# Patient Record
Sex: FEMALE | Race: WHITE | NOT HISPANIC OR LATINO | URBAN - METROPOLITAN AREA
[De-identification: names, ages, dates, MRNs, and addresses within clinical notes are randomized per-mention and may not be internally consistent; named-entity substitution may affect disease eponyms.]

---

## 2018-10-04 ENCOUNTER — HISTORICAL (OUTPATIENT)
Dept: ADMINISTRATIVE | Facility: HOSPITAL | Age: 58
End: 2018-10-04

## 2018-10-04 LAB
APPEARANCE, UA: CLEAR
BACTERIA #/AREA URNS AUTO: ABNORMAL /[HPF]
BILIRUB UR QL STRIP: NEGATIVE
COLOR UR: ABNORMAL
GLUCOSE (UA): NORMAL
HGB UR QL STRIP: NEGATIVE
HYALINE CASTS #/AREA URNS LPF: ABNORMAL /[LPF]
KETONES UR QL STRIP: NEGATIVE
LEUKOCYTE ESTERASE UR QL STRIP: NEGATIVE
NITRITE UR QL STRIP: NEGATIVE
PH UR STRIP: 6.5 [PH] (ref 4.5–8)
PROT UR QL STRIP: NEGATIVE
RBC #/AREA URNS AUTO: ABNORMAL /[HPF]
SP GR UR STRIP: 1 (ref 1–1.03)
SQUAMOUS #/AREA URNS LPF: ABNORMAL /[LPF]
UROBILINOGEN UR STRIP-ACNC: NORMAL
WBC #/AREA URNS AUTO: ABNORMAL /HPF

## 2018-10-06 LAB — FINAL CULTURE: NO GROWTH

## 2018-11-29 ENCOUNTER — HISTORICAL (OUTPATIENT)
Dept: ADMINISTRATIVE | Facility: HOSPITAL | Age: 58
End: 2018-11-29

## 2018-11-29 LAB
ABS NEUT (OLG): 4.21 X10(3)/MCL (ref 2.1–9.2)
ALBUMIN SERPL-MCNC: 4.5 GM/DL (ref 3.4–5)
ALBUMIN/GLOB SERPL: 1 RATIO (ref 1–2)
ALP SERPL-CCNC: 71 UNIT/L (ref 45–117)
ALT SERPL-CCNC: 26 UNIT/L (ref 12–78)
APTT PPP: 28.1 SECOND(S) (ref 23.3–37)
AST SERPL-CCNC: 32 UNIT/L (ref 15–37)
BASOPHILS # BLD AUTO: 0.07 X10(3)/MCL
BASOPHILS NFR BLD AUTO: 1 %
BILIRUB SERPL-MCNC: 0.4 MG/DL (ref 0.2–1)
BILIRUBIN DIRECT+TOT PNL SERPL-MCNC: 0.1 MG/DL
BILIRUBIN DIRECT+TOT PNL SERPL-MCNC: 0.3 MG/DL
BUN SERPL-MCNC: 8 MG/DL (ref 7–18)
CALCIUM SERPL-MCNC: 9.4 MG/DL (ref 8.5–10.1)
CHLORIDE SERPL-SCNC: 103 MMOL/L (ref 98–107)
CO2 SERPL-SCNC: 30 MMOL/L (ref 21–32)
CREAT SERPL-MCNC: 0.8 MG/DL (ref 0.6–1.3)
EOSINOPHIL # BLD AUTO: 0.16 X10(3)/MCL
EOSINOPHIL NFR BLD AUTO: 2 %
ERYTHROCYTE [DISTWIDTH] IN BLOOD BY AUTOMATED COUNT: 11.9 % (ref 11.5–14.5)
GLOBULIN SER-MCNC: 3.7 GM/ML (ref 2.3–3.5)
GLUCOSE SERPL-MCNC: 86 MG/DL (ref 74–106)
HCT VFR BLD AUTO: 42.5 % (ref 35–46)
HGB BLD-MCNC: 14.4 GM/DL (ref 12–16)
IMM GRANULOCYTES # BLD AUTO: 0.02 10*3/UL
IMM GRANULOCYTES NFR BLD AUTO: 0 %
INR PPP: 0.98 (ref 0.9–1.2)
LYMPHOCYTES # BLD AUTO: 3.53 X10(3)/MCL
LYMPHOCYTES NFR BLD AUTO: 42 % (ref 13–40)
MCH RBC QN AUTO: 32.5 PG (ref 26–34)
MCHC RBC AUTO-ENTMCNC: 33.9 GM/DL (ref 31–37)
MCV RBC AUTO: 95.9 FL (ref 80–100)
MONOCYTES # BLD AUTO: 0.5 X10(3)/MCL
MONOCYTES NFR BLD AUTO: 6 % (ref 4–12)
NEUTROPHILS # BLD AUTO: 4.21 X10(3)/MCL
NEUTROPHILS NFR BLD AUTO: 50 X10(3)/MCL
PLATELET # BLD AUTO: 181 X10(3)/MCL (ref 130–400)
PMV BLD AUTO: 11.5 FL (ref 7.4–10.4)
POTASSIUM SERPL-SCNC: 4.1 MMOL/L (ref 3.5–5.1)
PROT SERPL-MCNC: 8.2 GM/DL (ref 6.4–8.2)
PROTHROMBIN TIME: 12.3 SECOND(S) (ref 11.9–14.4)
RBC # BLD AUTO: 4.43 X10(6)/MCL (ref 4–5.2)
SODIUM SERPL-SCNC: 137 MMOL/L (ref 136–145)
WBC # SPEC AUTO: 8.5 X10(3)/MCL (ref 4.5–11)

## 2018-12-13 ENCOUNTER — HOSPITAL ENCOUNTER (OUTPATIENT)
Dept: MEDSURG UNIT | Facility: HOSPITAL | Age: 58
End: 2018-12-14
Attending: OBSTETRICS & GYNECOLOGY | Admitting: OBSTETRICS & GYNECOLOGY

## 2018-12-13 LAB — GROUP & RH: NORMAL

## 2018-12-14 LAB
ABS NEUT (OLG): 8.34 X10(3)/MCL (ref 2.1–9.2)
BASOPHILS # BLD AUTO: 0 X10(3)/MCL (ref 0–0.2)
BASOPHILS NFR BLD AUTO: 0 %
EOSINOPHIL # BLD AUTO: 0.1 X10(3)/MCL (ref 0–0.9)
EOSINOPHIL NFR BLD AUTO: 1 %
ERYTHROCYTE [DISTWIDTH] IN BLOOD BY AUTOMATED COUNT: 12 % (ref 11.5–17)
HCT VFR BLD AUTO: 36.2 % (ref 37–47)
HGB BLD-MCNC: 11.6 GM/DL (ref 12–16)
LYMPHOCYTES # BLD AUTO: 4.1 X10(3)/MCL (ref 0.6–4.6)
LYMPHOCYTES NFR BLD AUTO: 31 %
MCH RBC QN AUTO: 32.1 PG (ref 27–31)
MCHC RBC AUTO-ENTMCNC: 32 GM/DL (ref 33–36)
MCV RBC AUTO: 100.3 FL (ref 80–94)
MONOCYTES # BLD AUTO: 0.9 X10(3)/MCL (ref 0.1–1.3)
MONOCYTES NFR BLD AUTO: 6 %
NEUTROPHILS # BLD AUTO: 8.34 X10(3)/MCL (ref 2.1–9.2)
NEUTROPHILS NFR BLD AUTO: 62 %
PLATELET # BLD AUTO: 134 X10(3)/MCL (ref 130–400)
PMV BLD AUTO: 11.1 FL (ref 9.4–12.4)
RBC # BLD AUTO: 3.61 X10(6)/MCL (ref 4.2–5.4)
WBC # SPEC AUTO: 13.5 X10(3)/MCL (ref 4.5–11.5)

## 2019-01-24 ENCOUNTER — HISTORICAL (OUTPATIENT)
Dept: ADMINISTRATIVE | Facility: HOSPITAL | Age: 59
End: 2019-01-24

## 2019-01-24 LAB
APPEARANCE, UA: CLEAR
BACTERIA #/AREA URNS AUTO: ABNORMAL /[HPF]
BILIRUB UR QL STRIP: NEGATIVE
COLOR UR: COLORLESS
GLUCOSE (UA): NORMAL
HGB UR QL STRIP: 0.03 MG/DL
HYALINE CASTS #/AREA URNS LPF: ABNORMAL /[LPF]
KETONES UR QL STRIP: NEGATIVE
LEUKOCYTE ESTERASE UR QL STRIP: NEGATIVE
NITRITE UR QL STRIP: NEGATIVE
PH UR STRIP: 6.5 [PH] (ref 4.5–8)
PROT UR QL STRIP: NEGATIVE
RBC #/AREA URNS AUTO: ABNORMAL /[HPF]
SP GR UR STRIP: 1 (ref 1–1.03)
SQUAMOUS #/AREA URNS LPF: ABNORMAL /[LPF]
UROBILINOGEN UR STRIP-ACNC: NORMAL
WBC #/AREA URNS AUTO: ABNORMAL /HPF

## 2021-02-26 ENCOUNTER — IMMUNIZATION (OUTPATIENT)
Dept: OBSTETRICS AND GYNECOLOGY | Facility: CLINIC | Age: 61
End: 2021-02-26
Payer: OTHER GOVERNMENT

## 2021-02-26 DIAGNOSIS — Z23 NEED FOR VACCINATION: Primary | ICD-10-CM

## 2021-02-26 PROCEDURE — 91300 COVID-19, MRNA, LNP-S, PF, 30 MCG/0.3 ML DOSE VACCINE: CPT | Mod: PBBFAC

## 2021-03-19 ENCOUNTER — IMMUNIZATION (OUTPATIENT)
Dept: OBSTETRICS AND GYNECOLOGY | Facility: CLINIC | Age: 61
End: 2021-03-19
Payer: OTHER GOVERNMENT

## 2021-03-19 DIAGNOSIS — Z23 NEED FOR VACCINATION: Primary | ICD-10-CM

## 2021-03-19 PROCEDURE — 0002A COVID-19, MRNA, LNP-S, PF, 30 MCG/0.3 ML DOSE VACCINE: CPT | Mod: CV19,,, | Performed by: ANESTHESIOLOGY

## 2021-03-19 PROCEDURE — 91300 COVID-19, MRNA, LNP-S, PF, 30 MCG/0.3 ML DOSE VACCINE: CPT | Mod: ,,, | Performed by: ANESTHESIOLOGY

## 2021-03-19 PROCEDURE — 91300 COVID-19, MRNA, LNP-S, PF, 30 MCG/0.3 ML DOSE VACCINE: ICD-10-PCS | Mod: ,,, | Performed by: ANESTHESIOLOGY

## 2021-03-19 PROCEDURE — 0002A COVID-19, MRNA, LNP-S, PF, 30 MCG/0.3 ML DOSE VACCINE: ICD-10-PCS | Mod: CV19,,, | Performed by: ANESTHESIOLOGY

## 2022-04-11 ENCOUNTER — HISTORICAL (OUTPATIENT)
Dept: ADMINISTRATIVE | Facility: HOSPITAL | Age: 62
End: 2022-04-11

## 2022-04-24 VITALS
WEIGHT: 133.63 LBS | SYSTOLIC BLOOD PRESSURE: 125 MMHG | BODY MASS INDEX: 21.47 KG/M2 | OXYGEN SATURATION: 100 % | DIASTOLIC BLOOD PRESSURE: 85 MMHG | HEIGHT: 66 IN

## 2022-05-04 NOTE — HISTORICAL [facility] CERNER
This is a historical note converted from Cerm[facility]. Formatting and pictures may have been removed.  Please reference Kat for original formatting and attached multimedia. ?  Preprocedure Diagnosis: ?post menopausal bleeding, on HRT  ?  Postprocedure Diagnosis: same  ?  Procedure:??Endometrial biopsy  ?  ?   EBL: minimal  ?  Complications: none  ?  Description of procedure:  ?Informed consent was obtained. Consent forms were signed and witnessed. ?  The patient was placed in dorsal lithotomy position.  A bimanual exam revealed a 6 week uterus, anteverted, small, mobile cervix.  ?A speculum was placed in the vagina and good visualization of the cervix was achieved.  The cervix was swabbed with betadine x 3. ?The anterior lip of the cervix was grasped with a single-toothed tenaculum.  The uterus sounded to 6.5cm. ?The endometrial pipelle was advanced to the fundus without difficulty. ?Two passes were performed and?a small amount of?tissue was noted.?  The tenaculum was removed. ?Tenaculum sites were noted to be hemostatic. All instruments were removed from the vagina. ?The patient tolerated the procedure well. ?  Endometrial biopsy was labeled and sent to pathology.  ?  ?   Of note, the patient had menstrual cramping over the weekend and is still having light spotting/bleeding at times.  Additionally, the patient mentioned that her symptoms of vaginal itching persisted despite metrogel and 1 dose of diflucan.? She feels it improved after 1st diflucan dose (100mg PO), therefore took a 2nd dose.? Denies discharge.  ?  Will Rx a 3rd dose of diflucan.? We discussed this may be related to bleeding, change in pH, and possibly recent yeast.?  I will call her with results from biopsy today.  ?  She desires hysterectomy as management and does not want hysteroscopy if polyp is seen on biopsy.? She is tired of the bleeding and wishes to proceed with definitive management.  Calendar reviewed, and will schedule for Thurs 12/13 at  [facility]-- TLH/BSO.  ?  I reviewed with her hysterectomy in detail including with or without ovarian removal, approaches (vaginal or laparoscopic) risks/benefits of each.? All questions were answered.  We will proceed with :? TLH/BSO in mid December.  I will see her 11/29 Thurs for pre-op visit.?   THIS IS PROCEDURE NOTE*

## 2022-05-04 NOTE — HISTORICAL [facility] CERNER
This is a historical note converted from Kat. Formatting and pictures may have been removed.  Please reference Kat for original formatting and attached multimedia. Indication for Surgery  59 yo F  with persistent postmenopausal bleeding on hormone replacement therapy, despite changes in prometrium dosing. ?Endometrial biopsy was benign prior to surgery. ?1 prior ?delivery.??  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  Surgeon(s)  Dr. LEO Griffin  Assistant  Dr. ALL Cooper  Anesthesia  GETA, 1% lidocaine plain  Estimated Blood Loss  50cc  IVF: 1200cc  Urine Output  250cc  Findings  uterus 6 weeks size, small 2cm subserosal myoma on fundus and 1 additional 1cm subserosal. ?Ovaries atrophic bilaterally, no masses; tubes normal in appearance  Vesicouterine space with midline dense adhesion to midline lower uterine segment.  No endometriosis, appendix normal in appearance. ?Left sigmoid colon adherent to left pelvic sidewall.?  Few small diverticulosis of sigmoid colon noted- non inflamed. ?No upper abdominal adhesions or abnormalities.  ?   Cystoscopy: no bladder masses, sutures or stones.? No ulcerations or signs of infection.? Brisk bilateral efflux of urine from each ureteral orifice at end of case.  Specimen(s)  uterus, cervix, both tubes and both ovaries  Complications  none  Technique  ?The patient was taken to the OR placed in dorsal supine position. ?She received Ancef for infection prophylaxis and SCDs for DVT prophylaxis. ?General anesthesia was then obtained. She was prepped and draped in the normal sterile fashion, arms were padded and tucked at her sides and legs placed in the dorsal lithotomy position. ?Attention was paid to the vagina where  a singleton catheter was inserted into the bladder. A speculum was placed. ?The cervix was grasped with a single tooth tenaculum and dilated with a small Hegar dilator.?The uterus was sound to 8cm. The MIGUEL manipulator with Koh cup was assembled and inserted into the uterus in standard fashion.  ?????  ?????Attention was then paid to the abdomen. ?1% Lidocaine plain was injected into the umbilicus while a 5mm incision made. ?The abdominal wall was lifted upward and an 5mm visiport trocar with the 0-degree laparoscope was inserted. Placement was confirmed with the laparoscope. ?The abdomen and pelvis were insufflated with CO2 gas to a level of 15 mmHg. ?No visceral or vascular injury occurred with entry into abdomen. ?Survey of the upper abdomen was taken. At that time, Trendelenburg position was obtained to keep the bowel out of the operative field. ?A survey of the abdomen and pelvis was performed with findings as above.??Three additional trocars were then placed under direct laparoscopic visualization in the same fashion as the first using local prior to incision. One 5mm left lower quadrant, and two 5mm right lower and right mid quadrants.? The umbilical trocar was exchanged for a 12mm under visualization and the skin incision enlarged.???  ?????  ?????First, attention was paid to right pelvic sidewall where the round ligament was transected using the Harmonic device.? This incision was carried parallel to the infundibulopelvic (IP)?ligament to the pelvic brim.?The retroperitoneal space was explored and the ureter identified transperitoneally well beneath the IP ligament. A window was created in the peritoneum beneath the right ovary.? The?ovarian?vessels were then?coagulated and transected with the bipolar then the ultrasonic device?at least 2cm away from the ovary to ensure no ovarian remnant.? The?posterior peritoneum was?transected down to the level of the Koh cup and the anterior peritoneum as well.? The uterine  vessels were isolated and coagulated with the?bipolar device.??The bipolar Jacky device was used to coagulate the uterine vessels, which were then?transected with the ultrasonic device at the level of the Koh ring.? The?vesicouterine space was?densely adherent in the midline, and slight increased vascularity?which were controlled?with the?harmonic device.? Care was taken to remain away from the bladder.  ?   ??Attention was then turned to the left side.? The sigmoid adhesions to the?left pelvic brim and overlying the?entire infundibulopelvic ligament?were taken down with the harmonic device.? Careful traction of the nearby bowel was utilized to remove this for visualization of the ovarian vessels.? These were skeletonized about?2-3 cm from the ovary, again to?avoid the risk of ovarian remnant.??The left lateral peritoneum was incised and the IP ligament?isolated with a window beneath the ovary and into the posterior broad peritoneum created.? The ovarian vessels were then coagulated and transected at least 2cm away from the ovary.? The ureter was noted to be well beneath this.??The posterior peritoneum was then transected down to the level of the Koh ring.? The anterior vesicouterine space was opened, the uterine vessels?skeletonized and coagulated.? They were then transected with?the Harmonic device.?Additional anterior vesicouterine dissection occurred to release the dense midline adhesion.??blunt dissection was performed in the vesicouterine plane, and the Harmonic device was used against the uterine serosa with minimal use of energy to transect the?overlying peritoneum and?fibrotic adhesions.?  ?????  ?????Once the bladder was well below the area of planned colpotomy,?the colpotomy was performed with the ultrasonic device and upward traction on the uterus. ?This was carried circumferentially around until the cervix was freed from the superior aspect of the vagina. ?The uterus, cervix, bilateral tubes and ovaries  were then removed vaginally.? The pneumo-occluder was then replaced vaginally.? The specimen sent for permanent pathologic review.???  ?   ?????The vaginal cuff was closed with the 2-0 barbed PDO (Stratifix) suture in two layers. ?The initial layer was of vaginal epithelium, followed by the anterior pubocervical fascia to the posterior peritoneum.??A modified?McCalls procedure was then performed.??A 02-0 Prolene suture was placed in the left uterosacral?ligament, reefed through the posterior vaginal peritoneum at the level of the cuff, then run through the right uterosacral ligament for cuff suspension.? It was tied in the midline and excellent support noted.?All needles were confirmed as removed.?The pelvis was then copiously irrigated, irrigant removed, and hemostasis was confirmed.  ?   The midline?incision was then closed with a 0-Vicryl suture using a Cristofer So needle under direct visualization. ?All instruments were removed and all trocars. ?Five manual breaths were given for release of the pneumoperitoneum through the remaining trocar, which was then removed. ?The skin was reapproximated with 4-0 monocryl in a subcuticular fashion, and covered with?skin glue.? Repeat injection of 1% lidocaine was placed in each incision.  ?????  ??Attention was then paid to the cystoscopy. ?Martinez was removed and the 70 degree cystoscope was introduced into the bladder without difficulty. The bladder was inspected and noted to be intact without evidence of trauma. There was noted to be efflux of urine from bilateral ureteral orifices; no sutures stones or masses noted. The cystoscope was removed, the bladder drained. The vagina was copiously irrigated and irrigant removed. ?Vaginal cuff inspected and intact/hemostatic.  ?The patient tolerated the procedure well. ?The needle, sponge and instrument counts were correct. The patient tolerated the procedure?well and was transferred?to the recovery?room in good  condition.  [1]?Brief Operative Note; Gaby POWELL, May S 12/13/2018 09:41 CST

## 2022-05-04 NOTE — HISTORICAL [facility] CERNER
This is a historical note converted from Kat. Formatting and pictures may have been removed.  Please reference Kat for original formatting and attached multimedia. Hospital Course  Admitted for scheduled surgery. Underwent TLh/BSO, adhesiolysis, mod McCalls culdoplasty and cystoscopy without event.? Did well overnight.? Staci solids, voiding, ambulating, passing flatus.? No concerns this am.  Has not taken a narcotic PO to this point- states she only has cramping sensation, no significant pain.  Physical Exam  Vitals & Measurements  T:?37.1? ?C (Oral)? TMIN:?36.2? ?C (Temporal Artery)? TMAX:?37.1? ?C (Oral)? HR:?71(Peripheral)? RR:?18? BP:?108/68? SpO2:?96%?  General: NAD, A/Ox3  Neck: no visible masses  HEENT: no lesions  Respiratory: CTAB, no wheezes, rales, or rhonchi bilaterally; good inspiratory effort  Cardiovascular: RRR no murmurs, rubs, or gallops  Abdomen: soft, nondistended, nontender to palpation, no masses palpated, active bowel sounds; incisions c/d/i w/ skin glue; no signs of infection  Extremities: symmetric, no edema, no calf tenderness  ?  Lab Results  Test Name Test Result Date/Time   WBC 13.5 x10(3)/mcL (High) 12/14/2018 04:03 CST   Hgb 11.6 gm/dL (Low) 12/14/2018 04:03 CST   Hct 36.2 % (Low) 12/14/2018 04:03 CST   Platelet 134 x10(3)/mcL 12/14/2018 04:03 CST   Discharge Plan  S/P hysterectomy?Z90.710  ?POD#1:  Meeting all post op goals, hemodynamically stable.?  Discharge to home.  F/u with me 1/4/19 as scheduled.? She has my contact information to call with any concerns.  ?  Ordered:  ibuprofen, 600 mg = 1 tab(s), Oral, q6hr, # 40 tab(s), 1 Refill(s), Pharmacy: [id number]  Notify Provider Vital Signs, 12/13/18 9:39:00 CST, notify MD if O2 unable to be weaned off by 1600, T > 101, HR > 110, HR < 55, SBP > 160, DBP > 90, DBP < 60, RR < 12, O2 sat < 92, UO < 120cc/4hr  Place in Outpatient Observation, 12/13/18 9:39:00 CST, Gaby POWELL, May S Post-Op, S/P hysterectomy, No  Regular  Diet, 12/13/18 9:39:00 CST, Start Meal: Next Meal  ?  Orders:  acetaminophen-oxyCODONE, 1 tab(s), form: Tab, Oral, q3hr PRN for pain, first dose 12/13/18 9:39:00 CST, mild (Pain scale 1-3)  acetaminophen-oxyCODONE, 2 tab(s), form: Tab, Oral, q3hr PRN for pain, first dose 12/13/18 9:39:00 CST, moderate to severe (Pain scale 4-10)  acetaminophen-oxyCODONE, 1 tab(s), Oral, q3hr, PRN PRN pain, # 15 tab(s), 0 Refill(s), Pharmacy: [facility]/pharmacy #[id number]  diphenhydrAMINE, 12.5 mg, form: Liquid, Oral, QID PRN for as needed for itching, first dose 12/13/18 9:39:00 CST  docusate, 100 mg = 1 cap(s), Oral, BID, PRN PRN for constipation, # 20 cap(s), 0 Refill(s), Pharmacy: [facility]/pharmacy #[id number]  ketorolac, 30 mg, form: Injection, IV Push, q6hr, Order duration: 5 day(s), first dose 12/13/18 11:00:00 CST, stop date 12/18/18 10:59:00 CST  promethazine, 12.5 mg, form: Injection, IV Push, q4hr PRN for nausea/vomiting, first dose 12/13/18 10:39:00 CST  Advance Diet as Tolerated, 12/13/18 9:39:00 CST  Below the Knee Intermittent Pneumatic Compression Device, 12/13/18 9:39:00 CST, Constant Order  Incentive Spirometry, 12/13/18 9:39:00 CST, TID for 48 hr, Stop date 12/15/18 13:59:00 CST  Intake and Output, 12/13/18 9:39:00 CST, qShift, 48, hr  Pathology Tissue Request, 12/13/18 8:22:00 CST, AP Specimen, Uterus, Cervix, Bilateral Tubes and Ovaries, Collected, [id number], [id number], 12/13/18 13:53:12 CST, RT - Routine, 12/13/18 8:22:00 CST  Remove Compression Device, Inspect skin, Reapply, and Document Assessment, 12/13/18 9:39:00 CST, qShift  Surgical Care Quality Measures, 12/13/18 9:39:00 CST, Stop date 12/13/18 9:39:00 CST  Surgical Path Final Report, [id number], [id number], RT - Routine  Vital Signs, 12/13/18 9:39:00 CST, q4hr, every 15 minutes x 4 then every 4 hours  Patient Discharge Condition  stable  Discharge Disposition  home with

## 2022-05-04 NOTE — HISTORICAL [facility] CERNER
This is a historical note converted from Cermarv. Formatting and pictures may have been removed.  Please reference Kat for original formatting and attached multimedia. History of Present Illness  Mrs. Urban is a 57 yo F  referred by Dr. Love for post menopausal bleeding.? She has undergone E/T pellet placement for 2 years now, and reports experiencing light spotting in the mornings off and on for about 1 year.? This subsided for sometime, then resumed recently.? She is taking 400 prometrium. outside ([facility]) US images reviewed, endometrial biopsy was performed.  ?   Today,?pt reports taking second round of abx- bactrim that completed on Tuesday. Urine symptoms are improved, no suprapubic tenderness.  ?  Review of Systems  no fever/chills, no changes in health status since our pre-op visit except UTI as documented above.?  ?   Physical Exam  ???Vitals & Measurements  ?BP: 102/66:? T: 36.9? P: 70??O2: 99%  ??HT:?167.7?cm? HT:?167.7?cm? HT:?167.7?cm? WT:?61.6?kg? WT:?61.6?kg? WT:?61.6?kg? BMI:?21.9?  ??General: NAD, A/Ox3  ??Respiratory:? CTA Bilaterally, no wheezes/rales/rhonchi, good inspiratory effort  ??Cardiac: RRR, no MRGs  ??Abdomen: soft,? nondistended, nontender to palpation,?no masses palpated- well healed?pfann scar.  ??Extremities: no edema, no calf tenderness bilaterally,?symmetric  ?   Previous pelvic exam 2018:  ?   ??External genitalia: Normal female anatomy, no masses/lesions. Normal appearing urethral meatus. Normal appearing external anus. No lymphadenopathy.  ??Sterile speculum exam: vaginal mucosa normal in appearance. Pink. No masses/lesions. Cervix well visualized, smooth in contour no masses or lesions. Os normal in appearance, no blood or discharge coming from the os.  ??Bimanual exam: Vaginal with?moderate capacity. Uterus 8 cm in size, no cervical motion tenderness.?No descent, mobile, not broad. No adnexal fullness/tenderness. Urethra and Bladder nontender.  [1]  ?  ?  ?  Assessment/Plan  1.?Post-menopause bleeding?N95.0  Pertinent labs below.  T&S confirmed as in lab/running.  ?   Plan: ??TLH, BSO, with Modified McCalls culdoplasty,?cystoscopy  ?  EKG reviewed- will discuss with anesthesia.? Pt mentioned she had abnormal EKG years ago and workup was negative.? Remains asympatomatic.  ?  ?? Problem List/Past Medical History  ??Ongoing  ??Abnormal uterine bleeding  ??Autoimmune  ??Blood magnesium abnormal  ??Family history of diabetes mellitus type 2  ??History of colonic polyp  ??Hormone replacement therapy  ??Hot flashes  ??IBS - Irritable bowel syndrome  ??Ocular migraine  ??Osteopenia  ??Post-menopause bleeding  ??Thickened endometrium  ??Thyroiditis  ??Vitamin D deficiency  Procedure/Surgical History  ?breast augmention  (2004)   (1990)    (1988)   (10/28/1985)   ?  Medications  ??biotin 5000 mcg oral tablet, disintegrating, 5000 mcg= 1 tab(s), Oral, Daily  ??Depo-Testosterone, IM, q4wk  ??estradiol pellets as directed  ??magnesium oxide 400 mg oral tablet, 400 mg= 1 tab(s), Oral, Daily  ??NATURE-THROID 65 MG TABLET, 65 mg= 1 tab(s), Oral, Daily  ??PROGESTERONE 200 MG CAPSULE, 400 mg= 2 cap(s), Oral, qPM  ??Vitamin A-D-K/ bioTE 5000 unit capsules  ??Zofran ODT 8 mg oral tablet, disintegrating, 8 mg= 1 tab(s), Oral, BID, PRN  Allergies  LamISIL?(unknown)- systemic skin reaction (oral dosing)  ciprofloxacin?(black box)-- joint/tendon issues  Social History  ??Alcohol  ???Current, Liquor, 1-2 times per month, 2018  ??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 Sexually active at age 16 Years. Number of current partners 1. Number of lifetime partners 3. Sexual orientation: Heterosexual. Uses condoms: No. Other contraceptive use: N/A. History of sexual abuse: No. Other sexual concerns: N/A., 2018  ??Substance Abuse  ???Never,  09/13/2018  ??Tobacco  ???Never smoker, N/A, Previous treatment: None. Household tobacco concerns: No., 10/04/2018  ???Never smoker, 04/03/2018  Family History  ??Diabetes mellitus type 2: Father.  ??Hypertension.: Father and Brother.  Immunizations  ??Vaccine ?Date ?Status   ?influenza virus vaccine, inactivated 10/04/2018 Given   ?  Diagnostic Results  ?  Final Diagnosis?(Verified)  ?  Endometrium, Biopsy:  - Benign endometrium with stromal breakdown.  - No evidence of malignancy.  ?  Signature Line  *Electronically Signed*  ?? Susan Hensley MD  Verified: 10/08/18 15:31  ?  ? [2]  ?  5/2016  ?Final Diagnosis (Auth (Verified))  ??NEGATIVE FOR INTRAEPITHELIAL LESION OR MALIGNANCY. NIL  ???HR HPV neg.  ?Additional Findings 1 (Auth (Verified))  ?Slight estrogen effect.  ?  ?Gyn Specimen Type (Auth (Verified))  ?Thin Prep Pap Cervical-Auto/Man Screen  ?Statement of Adequacy (Auth (Verified)) [3]   [1]  Lab Results  Test Name Test Result Date/Time   Sodium Lvl 137 mmol/L 11/29/2018 11:27 CST   Potassium Lvl 4.1 mmol/L 11/29/2018 11:27 CST   Creatinine 0.80 mg/dL 11/29/2018 11:27 CST   AST 32 unit/L 11/29/2018 11:27 CST   ALT 26 unit/L 11/29/2018 11:27 CST   Alk Phos 71 unit/L 11/29/2018 11:27 CST   WBC 8.5 x10(3)/mcL 11/29/2018 11:27 CST   Hgb 14.4 gm/dL 11/29/2018 11:27 CST   Hct 42.5 % 11/29/2018 11:27 CST   Platelet 181 x10(3)/mcL 11/29/2018 11:27 CST   [1]?Office Visit Note/Pre-Op; Gaby POWELL, May S 11/28/2018 17:39 CST